# Patient Record
Sex: FEMALE | Race: OTHER | URBAN - METROPOLITAN AREA
[De-identification: names, ages, dates, MRNs, and addresses within clinical notes are randomized per-mention and may not be internally consistent; named-entity substitution may affect disease eponyms.]

---

## 2022-03-19 PROBLEM — E89.0 POSTOPERATIVE HYPOTHYROIDISM: Status: ACTIVE | Noted: 2020-01-24

## 2022-03-19 PROBLEM — E78.00 HYPERCHOLESTEREMIA: Status: ACTIVE | Noted: 2020-01-24

## 2022-03-19 PROBLEM — I10 HYPERTENSION: Status: ACTIVE | Noted: 2020-01-24

## 2022-03-19 PROBLEM — G47.33 OSA (OBSTRUCTIVE SLEEP APNEA): Status: ACTIVE | Noted: 2020-01-24

## 2022-03-20 PROBLEM — K21.9 ACID REFLUX: Status: ACTIVE | Noted: 2020-01-24

## 2023-12-13 ENCOUNTER — TELEPHONE (OUTPATIENT)
Age: 68
End: 2023-12-13

## 2023-12-13 NOTE — TELEPHONE ENCOUNTER
Ex 133    [facility] called to say she has faxed referral monitor 48 hours.  Fax 11/30/23 6:20 pm.  Still no referral in system due to mis match with pt name.     Jaylyn white. Please call pt or [facility] to schedule and obtain .

## 2023-12-15 NOTE — TELEPHONE ENCOUNTER
[facility] called back about this pt. I fixed the pt name to what [facility] said its supposed to be and she will be trying to fax the referral again to me. Fax: [phone]

## 2023-12-26 NOTE — TELEPHONE ENCOUNTER
Crystal,    This is the referral I sent an email on. Did you receive it yet?  There is an issue with the patient's name.

## 2024-01-22 ENCOUNTER — INITIAL CONSULT (OUTPATIENT)
Age: 69
End: 2024-01-22

## 2024-01-22 VITALS
WEIGHT: 140 LBS | SYSTOLIC BLOOD PRESSURE: 120 MMHG | HEART RATE: 63 BPM | DIASTOLIC BLOOD PRESSURE: 80 MMHG | BODY MASS INDEX: 25.76 KG/M2 | HEIGHT: 62 IN

## 2024-01-22 DIAGNOSIS — R06.83 SNORING: ICD-10-CM

## 2024-01-22 DIAGNOSIS — I10 PRIMARY HYPERTENSION: ICD-10-CM

## 2024-01-22 DIAGNOSIS — R00.2 PALPITATIONS: Primary | ICD-10-CM

## 2024-01-22 PROCEDURE — 3079F DIAST BP 80-89 MM HG: CPT | Performed by: INTERNAL MEDICINE

## 2024-01-22 PROCEDURE — 3074F SYST BP LT 130 MM HG: CPT | Performed by: INTERNAL MEDICINE

## 2024-01-22 PROCEDURE — 93000 ELECTROCARDIOGRAM COMPLETE: CPT | Performed by: INTERNAL MEDICINE

## 2024-01-22 PROCEDURE — 99244 OFF/OP CNSLTJ NEW/EST MOD 40: CPT | Performed by: INTERNAL MEDICINE

## 2024-01-22 RX ORDER — LEVOTHYROXINE SODIUM 0.1 MG/1
100 TABLET ORAL DAILY
COMMUNITY
Start: 2023-12-07

## 2024-01-22 RX ORDER — OMEPRAZOLE 40 MG/1
40 CAPSULE, DELAYED RELEASE ORAL DAILY
COMMUNITY
Start: 2023-12-07

## 2024-01-22 RX ORDER — CELECOXIB 100 MG/1
100 CAPSULE ORAL 2 TIMES DAILY
COMMUNITY
Start: 2023-12-07

## 2024-01-22 RX ORDER — CALCITRIOL 0.25 UG/1
0.5 CAPSULE, LIQUID FILLED ORAL 2 TIMES DAILY
COMMUNITY
Start: 2023-12-07

## 2024-01-22 RX ORDER — LISINOPRIL 20 MG/1
20 TABLET ORAL DAILY
COMMUNITY

## 2024-01-22 RX ORDER — FLUTICASONE PROPIONATE 50 MCG
2 SPRAY, SUSPENSION (ML) NASAL DAILY
COMMUNITY
Start: 2023-12-07

## 2024-01-22 RX ORDER — ALENDRONATE SODIUM 70 MG/1
70 TABLET ORAL
COMMUNITY
Start: 2023-12-07

## 2024-01-22 RX ORDER — OLMESARTAN MEDOXOMIL 20 MG/1
10 TABLET ORAL DAILY
COMMUNITY
Start: 2023-12-07

## 2024-01-22 RX ORDER — SIMETHICONE 80 MG
80 TABLET,CHEWABLE ORAL 4 TIMES DAILY PRN
COMMUNITY
Start: 2023-12-07

## 2024-01-22 RX ORDER — HYDROCHLOROTHIAZIDE 12.5 MG/1
12.5 CAPSULE, GELATIN COATED ORAL DAILY
COMMUNITY
Start: 2023-12-07

## 2024-01-22 ASSESSMENT — ENCOUNTER SYMPTOMS
SHORTNESS OF BREATH: 0
DIARRHEA: 0
COUGH: 0
NAUSEA: 0
SORE THROAT: 0
VOMITING: 0

## 2024-01-22 NOTE — PROGRESS NOTES
[address] [facility], [address] [place] [address]  PHONE: [phone]    SUBJECTIVE:   Andrea Pelaez is a 68 y.o. female 1955   seen for a consultation visit regarding the following:     Chief Complaint   Patient presents with    Consultation     palpitations              Consultation is requested for evaluation of Consultation (palpitations)   .    History of present illness: 68 y.o. female with PMH HTN, HLD, EJ presenting for evaluation of palpitations. Patient reports waking up in the middle of the night with palpitations and shortness of breath. This has been happening for about one year. She also reports significant snoring at nighttime. She reports having a sleep test about 3 years ago but is not sure of the results and never required any treatment thereafter. No palpitations or shortness of breath during the day. No chest pain. No other acute complaints.    Past Medical History, Past Surgical History, Family history, Social History, and Medications were all reviewed with the patient today and updated as necessary.       No Known Allergies  Past Medical History:   Diagnosis Date    Arthritis     GERD (gastroesophageal reflux disease)     Hypercholesterolemia     Hypertension     Hypothyroid      History reviewed. No pertinent surgical history.  Family History   Problem Relation Age of Onset    Heart Attack Mother 74    Heart Attack Father 82     Social History     Tobacco Use    Smoking status: Never    Smokeless tobacco: Never   Substance Use Topics    Alcohol use: Not on file       ROS:    Review of Systems   Constitutional: Negative for fever, weight gain and weight loss.   HENT:  Positive for congestion. Negative for sore throat.    Eyes:  Negative for visual disturbance.   Cardiovascular:  Positive for dyspnea on exertion and palpitations. Negative for chest pain, leg swelling and syncope.   Respiratory:  Negative for cough and shortness of breath.    Endocrine: Positive for

## 2024-02-12 ENCOUNTER — OFFICE VISIT (OUTPATIENT)
Age: 69
End: 2024-02-12

## 2024-02-12 VITALS
SYSTOLIC BLOOD PRESSURE: 112 MMHG | HEIGHT: 62 IN | DIASTOLIC BLOOD PRESSURE: 60 MMHG | BODY MASS INDEX: 26.5 KG/M2 | WEIGHT: 144 LBS | HEART RATE: 64 BPM

## 2024-02-12 DIAGNOSIS — E78.00 PURE HYPERCHOLESTEROLEMIA: Chronic | ICD-10-CM

## 2024-02-12 DIAGNOSIS — R00.2 PALPITATIONS: Primary | ICD-10-CM

## 2024-02-12 DIAGNOSIS — I47.10 SVT (SUPRAVENTRICULAR TACHYCARDIA): ICD-10-CM

## 2024-02-12 DIAGNOSIS — I10 PRIMARY HYPERTENSION: Chronic | ICD-10-CM

## 2024-02-12 PROCEDURE — 3074F SYST BP LT 130 MM HG: CPT | Performed by: INTERNAL MEDICINE

## 2024-02-12 PROCEDURE — 1123F ACP DISCUSS/DSCN MKR DOCD: CPT | Performed by: INTERNAL MEDICINE

## 2024-02-12 PROCEDURE — 3078F DIAST BP <80 MM HG: CPT | Performed by: INTERNAL MEDICINE

## 2024-02-12 PROCEDURE — 99214 OFFICE O/P EST MOD 30 MIN: CPT | Performed by: INTERNAL MEDICINE

## 2024-02-12 NOTE — PROGRESS NOTES
[address], [address]  [place] [address]  PHONE: [phone]      24    NAME:  Andrea Pelaez  : 1955  MRN: [id number]         SUBJECTIVE:   Andrea Pelaez is a 68 y.o. female seen for a follow up visit regarding the following:     Chief Complaint   Patient presents with    3 week follow-up    Hypertension            HPI:  Follow up  3 week follow-up and Hypertension   .      68 y.o. female with PMH HTN, HLD, EJ presenting for follow up evaluation of palpitations. She continues to have palpitations on occasion. No chest pain or dyspnea. No other acute complaints.        Key CAD CHF Meds            hydroCHLOROthiazide (MICROZIDE) 12.5 MG capsule (Taking)    Class: Historical Med    olmesartan (BENICAR) 20 MG tablet (Taking)    Class: Historical Med    simvastatin (ZOCOR) 10 MG tablet (Taking)    Class: Historical Med    lisinopril (PRINIVIL;ZESTRIL) 20 MG tablet    Class: Historical Med          Key Antihyperglycemic Medications       Patient is on no antihyperglycemic meds.                Past Medical History, Past Surgical History, Family history, Social History, and Medications were all reviewed with the patient today and updated as necessary.     Prior to Admission medications    Medication Sig Start Date End Date Taking? Authorizing Provider   hydroCHLOROthiazide (MICROZIDE) 12.5 MG capsule Take 1 capsule by mouth daily 23  Yes ProviderRavinder MD   levothyroxine (SYNTHROID) 100 MCG tablet Take 1 tablet by mouth daily 23  Yes Provider, MD Ravinder   fluticasone (FLONASE) 50 MCG/ACT nasal spray 2 sprays by Nasal route daily 23  Yes ProviderRavinder MD   celecoxib (CELEBREX) 100 MG capsule Take 1 capsule by mouth 2 times daily 23  Yes Ravinder Frazier MD   calcitRIOL (ROCALTROL) 0.25 MCG capsule Take 2 capsules by mouth 2 times daily 23  Yes ProviderRavinder MD   alendronate (FOSAMAX)

## 2024-02-13 ENCOUNTER — TELEPHONE (OUTPATIENT)
Age: 69
End: 2024-02-13

## 2024-02-13 NOTE — TELEPHONE ENCOUNTER
Spoke with [facility] and they verified they found referral and states they will contact pt to schedule appt.

## 2024-03-28 ENCOUNTER — TELEPHONE (OUTPATIENT)
Age: 69
End: 2024-03-28